# Patient Record
Sex: MALE | Race: WHITE | Employment: OTHER | URBAN - METROPOLITAN AREA
[De-identification: names, ages, dates, MRNs, and addresses within clinical notes are randomized per-mention and may not be internally consistent; named-entity substitution may affect disease eponyms.]

---

## 2023-04-06 LAB
AVERAGE GLUCOSE: 131
HBA1C MFR BLD: 6.2 %

## 2023-06-01 ENCOUNTER — HOSPITAL ENCOUNTER (OUTPATIENT)
Dept: CT IMAGING | Age: 73
Discharge: HOME | End: 2023-06-01
Payer: MEDICARE

## 2023-06-01 DIAGNOSIS — R93.89 ABNORMAL CHEST X-RAY: ICD-10-CM

## 2023-06-01 PROCEDURE — 71250 CT THORAX DX C-: CPT

## 2023-06-28 ENCOUNTER — INITIAL CONSULT (OUTPATIENT)
Age: 73
End: 2023-06-28
Payer: MEDICARE

## 2023-06-28 VITALS
HEART RATE: 53 BPM | BODY MASS INDEX: 28 KG/M2 | WEIGHT: 200 LBS | DIASTOLIC BLOOD PRESSURE: 68 MMHG | SYSTOLIC BLOOD PRESSURE: 130 MMHG | HEIGHT: 71 IN

## 2023-06-28 DIAGNOSIS — I48.20 CHRONIC A-FIB (HCC): ICD-10-CM

## 2023-06-28 DIAGNOSIS — Z76.89 ESTABLISHING CARE WITH NEW DOCTOR, ENCOUNTER FOR: Primary | ICD-10-CM

## 2023-06-28 LAB
ANION GAP SERPL CALC-SCNC: 6 MMOL/L (ref 2–11)
BUN SERPL-MCNC: 14 MG/DL (ref 8–23)
CALCIUM SERPL-MCNC: 9.6 MG/DL (ref 8.3–10.4)
CHLORIDE SERPL-SCNC: 104 MMOL/L (ref 101–110)
CO2 SERPL-SCNC: 28 MMOL/L (ref 21–32)
CREAT SERPL-MCNC: 0.9 MG/DL (ref 0.8–1.5)
GLUCOSE SERPL-MCNC: 94 MG/DL (ref 65–100)
POTASSIUM SERPL-SCNC: 4.5 MMOL/L (ref 3.5–5.1)
SODIUM SERPL-SCNC: 138 MMOL/L (ref 133–143)

## 2023-06-28 PROCEDURE — 1123F ACP DISCUSS/DSCN MKR DOCD: CPT | Performed by: INTERNAL MEDICINE

## 2023-06-28 PROCEDURE — 99204 OFFICE O/P NEW MOD 45 MIN: CPT | Performed by: INTERNAL MEDICINE

## 2023-06-28 PROCEDURE — G8427 DOCREV CUR MEDS BY ELIG CLIN: HCPCS | Performed by: INTERNAL MEDICINE

## 2023-06-28 PROCEDURE — G8419 CALC BMI OUT NRM PARAM NOF/U: HCPCS | Performed by: INTERNAL MEDICINE

## 2023-06-28 PROCEDURE — 93000 ELECTROCARDIOGRAM COMPLETE: CPT | Performed by: INTERNAL MEDICINE

## 2023-06-28 PROCEDURE — 1036F TOBACCO NON-USER: CPT | Performed by: INTERNAL MEDICINE

## 2023-06-28 PROCEDURE — 3017F COLORECTAL CA SCREEN DOC REV: CPT | Performed by: INTERNAL MEDICINE

## 2023-06-28 RX ORDER — ATORVASTATIN CALCIUM 20 MG/1
20 TABLET, FILM COATED ORAL NIGHTLY
COMMUNITY
Start: 2022-05-24

## 2023-06-28 RX ORDER — ESCITALOPRAM OXALATE 20 MG/1
20 TABLET ORAL DAILY
COMMUNITY
Start: 2015-01-18

## 2023-06-28 RX ORDER — MAGNESIUM 200 MG
TABLET ORAL
COMMUNITY

## 2023-06-28 RX ORDER — METFORMIN HYDROCHLORIDE 500 MG/1
2000 TABLET, EXTENDED RELEASE ORAL DAILY
COMMUNITY
Start: 2020-09-23

## 2023-06-29 ENCOUNTER — TELEPHONE (OUTPATIENT)
Age: 73
End: 2023-06-29

## 2023-06-29 NOTE — RESULT ENCOUNTER NOTE
Kidney function is completely normal on your lab test.  You are on the correct dose of Xarelto at 20 mg daily.     Heather Leblanc MD

## 2023-07-10 ENCOUNTER — TELEPHONE (OUTPATIENT)
Age: 73
End: 2023-07-10

## 2023-07-10 NOTE — TELEPHONE ENCOUNTER
Spoke to pt and informed him regarding Dr. Montes Loop result and conclusion. Pt verbalized understanding.

## 2023-07-10 NOTE — TELEPHONE ENCOUNTER
----- Message from Lilibeth Song MD sent at 7/10/2023  9:00 AM EDT -----  Echo shows some abnormalities, none of which are urgent we can discuss them at your follow up appointment.     Maria G Sheehan MD

## 2023-08-25 ENCOUNTER — OFFICE VISIT (OUTPATIENT)
Dept: PULMONOLOGY | Age: 73
End: 2023-08-25
Payer: MEDICARE

## 2023-08-25 VITALS
SYSTOLIC BLOOD PRESSURE: 120 MMHG | DIASTOLIC BLOOD PRESSURE: 80 MMHG | RESPIRATION RATE: 20 BRPM | OXYGEN SATURATION: 97 % | HEIGHT: 71 IN | BODY MASS INDEX: 28.28 KG/M2 | HEART RATE: 65 BPM | TEMPERATURE: 98 F | WEIGHT: 202 LBS

## 2023-08-25 DIAGNOSIS — J84.9 ILD (INTERSTITIAL LUNG DISEASE) (HCC): Primary | ICD-10-CM

## 2023-08-25 DIAGNOSIS — J84.9 ILD (INTERSTITIAL LUNG DISEASE) (HCC): ICD-10-CM

## 2023-08-25 LAB
CK SERPL-CCNC: 45 U/L (ref 21–215)
CRP SERPL-MCNC: <0.3 MG/DL (ref 0–0.9)
ERYTHROCYTE [SEDIMENTATION RATE] IN BLOOD: 19 MM/HR

## 2023-08-25 PROCEDURE — 3017F COLORECTAL CA SCREEN DOC REV: CPT | Performed by: INTERNAL MEDICINE

## 2023-08-25 PROCEDURE — G8427 DOCREV CUR MEDS BY ELIG CLIN: HCPCS | Performed by: INTERNAL MEDICINE

## 2023-08-25 PROCEDURE — G8419 CALC BMI OUT NRM PARAM NOF/U: HCPCS | Performed by: INTERNAL MEDICINE

## 2023-08-25 PROCEDURE — 99204 OFFICE O/P NEW MOD 45 MIN: CPT | Performed by: INTERNAL MEDICINE

## 2023-08-25 PROCEDURE — 1036F TOBACCO NON-USER: CPT | Performed by: INTERNAL MEDICINE

## 2023-08-25 PROCEDURE — 1123F ACP DISCUSS/DSCN MKR DOCD: CPT | Performed by: INTERNAL MEDICINE

## 2023-08-25 NOTE — PROGRESS NOTES
Control; Adjustment of the mA and/or kV according to patient  size (this includes techniques or standardized protocols for targeted exams  where dose is matched to indication/reason for exam); and Use of Iterative  Reconstruction Technique. Total radiation dose: 690 mGy-cm    Comparison:  None. Findings:  Images through the lungs demonstrate no evidence of pulmonary nodule or mass. No  effusions are identified. High-resolution imaging performed supine and prone demonstrates peripheral  reticulation as well as slight honeycombing in the peripheral lingula and septal  thickening at the lung bases with mild basilar bronchiectasis. There is coronary artery atherosclerotic disease. The heart is upper limits of  normal in size to mildly enlarged. No evidence of aortic aneurysmal dilatation  is seen. The heart and mediastinum are grossly unremarkable. No significant hilar or  mediastinal lymphadenopathy is demonstrated. Imaged chest wall structures as well as visualized portions of the upper abdomen  are unremarkable in appearance. Cholecystectomy. There are no acute osseous abnormalities. Impression  1. 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 6/1/23        Nuclear Medicine: No results found for this or any previous visit from the past 3650 days. PFTs:   No flowsheet data found. No results found for this or any previous visit. No results found for this or any previous visit. FeNO: No results found for this or any previous visit. FeNO and Likelihood of Eosinophilic Asthma   Unlikely Intermediate Likely   <25 ppb 25-50 ppb >50ppb     Exercise Oximetry:    8/25/23  Resting RA Sat - 97  Lowest ambulating RA Sat - 92    Echo:   TRANSTHORACIC ECHOCARDIOGRAM (TTE) COMPLETE (CONTRAST/BUBBLE/3D PRN) 07/03/2023    Interpretation Summary    Left Ventricle: Normal left ventricular systolic function.  EF by 2D [employer] Biplane is

## 2023-08-26 LAB — ANA SER QL: NEGATIVE

## 2023-08-28 LAB — RHEUMATOID FACT SER QL LA: NEGATIVE

## 2023-08-31 LAB
A FUMIGATUS1 AB SER QL ID: NEGATIVE
A PULLULANS AB SER QL: NEGATIVE
LACEYELLA SACCHARI AB SER QL: NEGATIVE
PIGEON SERUM AB QL ID: NEGATIVE
S RECTIVIRGULA IGG SER QL ID: NEGATIVE
T VULGARIS AB SER QL ID: NEGATIVE

## 2023-09-01 DIAGNOSIS — J84.9 ILD (INTERSTITIAL LUNG DISEASE) (HCC): Primary | ICD-10-CM

## 2023-09-29 ENCOUNTER — OFFICE VISIT (OUTPATIENT)
Age: 73
End: 2023-09-29
Payer: MEDICARE

## 2023-09-29 VITALS
BODY MASS INDEX: 24.5 KG/M2 | HEART RATE: 68 BPM | WEIGHT: 175 LBS | HEIGHT: 71 IN | SYSTOLIC BLOOD PRESSURE: 120 MMHG | DIASTOLIC BLOOD PRESSURE: 78 MMHG

## 2023-09-29 DIAGNOSIS — I48.11 LONGSTANDING PERSISTENT ATRIAL FIBRILLATION (HCC): ICD-10-CM

## 2023-09-29 DIAGNOSIS — I48.11 LONGSTANDING PERSISTENT ATRIAL FIBRILLATION (HCC): Primary | ICD-10-CM

## 2023-09-29 DIAGNOSIS — I50.23 ACUTE ON CHRONIC SYSTOLIC HEART FAILURE (HCC): ICD-10-CM

## 2023-09-29 DIAGNOSIS — D68.69 HYPERCOAGULABLE STATE DUE TO LONGSTANDING PERSISTENT ATRIAL FIBRILLATION (HCC): ICD-10-CM

## 2023-09-29 DIAGNOSIS — I48.11 HYPERCOAGULABLE STATE DUE TO LONGSTANDING PERSISTENT ATRIAL FIBRILLATION (HCC): ICD-10-CM

## 2023-09-29 LAB
ANION GAP SERPL CALC-SCNC: 9 MMOL/L (ref 2–11)
BUN SERPL-MCNC: 33 MG/DL (ref 8–23)
CALCIUM SERPL-MCNC: 9.2 MG/DL (ref 8.3–10.4)
CHLORIDE SERPL-SCNC: 100 MMOL/L (ref 101–110)
CO2 SERPL-SCNC: 30 MMOL/L (ref 21–32)
CREAT SERPL-MCNC: 1.3 MG/DL (ref 0.8–1.5)
GLUCOSE SERPL-MCNC: 136 MG/DL (ref 65–100)
MAGNESIUM SERPL-MCNC: 2.1 MG/DL (ref 1.8–2.4)
POTASSIUM SERPL-SCNC: 4.2 MMOL/L (ref 3.5–5.1)
SODIUM SERPL-SCNC: 139 MMOL/L (ref 133–143)

## 2023-09-29 PROCEDURE — 3017F COLORECTAL CA SCREEN DOC REV: CPT | Performed by: INTERNAL MEDICINE

## 2023-09-29 PROCEDURE — 1036F TOBACCO NON-USER: CPT | Performed by: INTERNAL MEDICINE

## 2023-09-29 PROCEDURE — 99214 OFFICE O/P EST MOD 30 MIN: CPT | Performed by: INTERNAL MEDICINE

## 2023-09-29 PROCEDURE — G8420 CALC BMI NORM PARAMETERS: HCPCS | Performed by: INTERNAL MEDICINE

## 2023-09-29 PROCEDURE — 1123F ACP DISCUSS/DSCN MKR DOCD: CPT | Performed by: INTERNAL MEDICINE

## 2023-09-29 PROCEDURE — G8427 DOCREV CUR MEDS BY ELIG CLIN: HCPCS | Performed by: INTERNAL MEDICINE

## 2023-09-29 RX ORDER — FUROSEMIDE 40 MG/1
40 TABLET ORAL DAILY
COMMUNITY

## 2023-09-29 RX ORDER — RAMIPRIL 2.5 MG/1
2.5 CAPSULE ORAL DAILY
COMMUNITY

## 2023-09-29 ASSESSMENT — ENCOUNTER SYMPTOMS
ABDOMINAL PAIN: 0
SHORTNESS OF BREATH: 0
EYES NEGATIVE: 1
ALLERGIC/IMMUNOLOGIC NEGATIVE: 1
RESPIRATORY NEGATIVE: 1
PHOTOPHOBIA: 0
EYE PAIN: 0
CHEST TIGHTNESS: 0
BACK PAIN: 0
GASTROINTESTINAL NEGATIVE: 1

## 2023-10-02 RX ORDER — FUROSEMIDE 20 MG/1
20 TABLET ORAL DAILY
Qty: 30 TABLET | Refills: 3 | Status: CANCELLED | OUTPATIENT
Start: 2023-10-02

## 2023-10-02 NOTE — TELEPHONE ENCOUNTER
----- Message from Vincenzo Lindsay MD sent at 10/2/2023  8:56 AM EDT -----  BMP that we recently checked shows some signs of mild dehydration. Lets cut back to Lasix 20 mg daily from Lasix 40 mg daily.     Jeff Green MD

## 2023-10-02 NOTE — TELEPHONE ENCOUNTER
I spoke to pt's wife, and explained it to her. She stated that they didn't get the lasix. I checked on my end and I don't see if it got called in or not.    Requested Prescriptions     Pending Prescriptions Disp Refills    furosemide (LASIX) 20 MG tablet 30 tablet 3     Sig: Take 1 tablet by mouth daily

## 2023-10-02 NOTE — RESULT ENCOUNTER NOTE
BMP that we recently checked shows some signs of mild dehydration. Lets cut back to Lasix 20 mg daily from Lasix 40 mg daily.     Staci Summers MD

## 2023-10-02 NOTE — TELEPHONE ENCOUNTER
Spoke with pt's wife again, and she stated that she found the medication and will have him take half of Lasix 40mg.

## 2023-10-26 ENCOUNTER — OFFICE VISIT (OUTPATIENT)
Dept: ENT CLINIC | Age: 73
End: 2023-10-26
Payer: MEDICARE

## 2023-10-26 VITALS — BODY MASS INDEX: 23.94 KG/M2 | HEIGHT: 71 IN | WEIGHT: 171 LBS | RESPIRATION RATE: 16 BRPM

## 2023-10-26 DIAGNOSIS — J45.41 MODERATE PERSISTENT REACTIVE AIRWAY DISEASE WITH ACUTE EXACERBATION: ICD-10-CM

## 2023-10-26 DIAGNOSIS — R49.0 DYSPHONIA: ICD-10-CM

## 2023-10-26 DIAGNOSIS — J38.01 PARALYSIS OF LEFT VOCAL CORD: Primary | Chronic | ICD-10-CM

## 2023-10-26 PROCEDURE — 1123F ACP DISCUSS/DSCN MKR DOCD: CPT | Performed by: PHYSICIAN ASSISTANT

## 2023-10-26 PROCEDURE — 99204 OFFICE O/P NEW MOD 45 MIN: CPT | Performed by: PHYSICIAN ASSISTANT

## 2023-10-26 PROCEDURE — 31575 DIAGNOSTIC LARYNGOSCOPY: CPT | Performed by: PHYSICIAN ASSISTANT

## 2023-10-26 RX ORDER — FLUTICASONE PROPIONATE 110 UG/1
2 AEROSOL, METERED RESPIRATORY (INHALATION) 2 TIMES DAILY
Qty: 12 G | Refills: 3 | Status: SHIPPED | OUTPATIENT
Start: 2023-10-26 | End: 2023-10-27 | Stop reason: SDUPTHER

## 2023-10-26 RX ORDER — TRAZODONE HYDROCHLORIDE 50 MG/1
TABLET ORAL
COMMUNITY
Start: 2023-10-02

## 2023-10-26 RX ORDER — ASCORBIC ACID 125 MG
TABLET,CHEWABLE ORAL
COMMUNITY
Start: 2023-06-03

## 2023-10-26 RX ORDER — PREDNISONE 20 MG/1
20 TABLET ORAL 2 TIMES DAILY
Qty: 10 TABLET | Refills: 1 | Status: SHIPPED | OUTPATIENT
Start: 2023-10-26 | End: 2023-10-27 | Stop reason: SDUPTHER

## 2023-10-26 ASSESSMENT — ENCOUNTER SYMPTOMS
EYE PAIN: 0
COLOR CHANGE: 0
WHEEZING: 0
VOMITING: 0
DIARRHEA: 0
COUGH: 0

## 2023-10-27 RX ORDER — FLUTICASONE PROPIONATE 110 UG/1
2 AEROSOL, METERED RESPIRATORY (INHALATION) 2 TIMES DAILY
Qty: 12 G | Refills: 3 | Status: SHIPPED | OUTPATIENT
Start: 2023-10-27 | End: 2024-10-26

## 2023-10-27 RX ORDER — PREDNISONE 20 MG/1
20 TABLET ORAL 2 TIMES DAILY
Qty: 10 TABLET | Refills: 0 | Status: SHIPPED | OUTPATIENT
Start: 2023-10-27 | End: 2023-11-01

## 2023-10-27 NOTE — TELEPHONE ENCOUNTER
Selin Chavis sent Rx in yesterday but pharmacy needs to be changed. Can you please sign off. Thank you.

## 2023-11-02 RX ORDER — FLUTICASONE FUROATE 100 UG/1
30 POWDER RESPIRATORY (INHALATION) 2 TIMES DAILY
Qty: 30 EACH | Refills: 3 | Status: SHIPPED | OUTPATIENT
Start: 2023-11-02 | End: 2023-12-02

## 2023-11-06 RX ORDER — FUROSEMIDE 20 MG/1
20 TABLET ORAL DAILY
Qty: 90 TABLET | Refills: 3 | Status: SHIPPED | OUTPATIENT
Start: 2023-11-06

## 2023-11-06 NOTE — TELEPHONE ENCOUNTER
----- Message from Nabeel Chinchilla MA sent at 2023  6:39 PM EST -----  Regarding: FW: Lasix  Contact: [phone]    ----- Message -----  From: Manny Triplett  Sent: 2023   3:47 PM EST  To: [facility] Clinical Staff  Subject: Lasix                                            Dr Daryl Garcia, Your team spoke with my wife [employer] in early October and reduced the Teva-furosemide (lasix) that I was given at the [facility] in [place] from 40 mg to 20 mg. I will be out of the medication this week. Do you want me to continue it? If so, will this be a long term thing? If I only need it for short term, please send a new prescription to the [facility] on  [address] [place] [address]. If I need to continue it long term will you send a new prescription to the [address] [place] [address].     Thank you,  Manny Triplett   93-

## 2023-11-13 ENCOUNTER — HOSPITAL ENCOUNTER (OUTPATIENT)
Dept: PHYSICAL THERAPY | Age: 73
Setting detail: RECURRING SERIES
Discharge: HOME | End: 2023-11-16
Payer: MEDICARE

## 2023-11-13 DIAGNOSIS — R49.0 DYSPHONIA: Primary | ICD-10-CM

## 2023-11-13 PROCEDURE — 92507 TX SP LANG VOICE COMM INDIV: CPT

## 2023-11-13 PROCEDURE — 92523 SPEECH SOUND LANG COMPREHEN: CPT

## 2023-11-13 NOTE — PROGRESS NOTES
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 Medications Last Reviewed:  2023  Subjective Comments:  Seen for initial evaluation and treatment today. Pain:  Patient does not c/o pain. Interventions Planned: (Treatment may consist of any combination of the following)    GOALS: (Goals have been discussed and agreed upon with patient.)  Short-Term Functional Goals: Time Frame: 8 weeks  Patient will demonstrate implementation of vocal hygiene techniques including improved hydration, reduced habitual coughing, throat clearing, and adherence to reflux precautions and medications regiment, reduction of vocally abusive behaviors with 80% adherence on a daily basis to reduce laryngeal inflammation. Patient will perform vocal function exercises with 80% efficiency with mod cues. Patient will implement vocal hygiene practices and relaxation techniques to reduce vocal strain with 80% accuracy with mod cues. Patient will demonstrate resonant voice therapy techniques across tasks with 80% accuracy given mod cues. Patient will sustain vowel for 10 seconds with SBA. Patient will apply easy onset of phonation across tasks with >80% accuracy given mod cues. Patient will demonstrate breath control for a minimum of 10 seconds when vocalizing.   Discharge Goals: Time

## 2023-11-20 ENCOUNTER — HOSPITAL ENCOUNTER (OUTPATIENT)
Dept: PHYSICAL THERAPY | Age: 73
Setting detail: RECURRING SERIES
Discharge: HOME | End: 2023-11-23
Payer: MEDICARE

## 2023-11-20 PROCEDURE — 92507 TX SP LANG VOICE COMM INDIV: CPT

## 2023-11-20 NOTE — PROGRESS NOTES
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 Medications Last Reviewed:  2023  Subjective Comments:  Patient accompanied by spouse. Has used the saline rinse which has been helping a lot with expelling congestion. Pain:  Patient does not c/o pain. Interventions Planned: (Treatment may consist of any combination of the following)  Voice based treatment, Vocal hygiene practices, Training in compensatory strategies, and Education  GOALS: (Goals have been discussed and agreed upon with patient.)  Short-Term Functional Goals: Time Frame: 8 weeks  Patient will demonstrate implementation of vocal hygiene techniques including improved hydration, reduced habitual coughing, throat clearing, and adherence to reflux precautions and medications regiment, reduction of vocally abusive behaviors with 80% adherence on a daily basis to reduce laryngeal inflammation. Patient will perform vocal function exercises with 80% efficiency with mod cues. Patient will implement vocal hygiene practices and relaxation techniques to reduce vocal strain with 80% accuracy with mod cues. Patient will demonstrate resonant voice therapy techniques across tasks with 80% accuracy given mod cues. Patient will sustain vowel for 10 seconds with SBA.   Patient will apply easy onset of

## 2023-11-29 ENCOUNTER — HOSPITAL ENCOUNTER (OUTPATIENT)
Dept: PHYSICAL THERAPY | Age: 73
Setting detail: RECURRING SERIES
End: 2023-11-29
Payer: MEDICARE

## 2023-11-29 NOTE — PROGRESS NOTES
Conchita Walsh  : 1950  Primary: [employer]  Secondary:  Peggy Freeman  [address]  Phone: [phone]  Fax: [phone]    SLP Visit Info:  Canceled Appointment: 1 ( sick)  Total # of Visits to Date: 2      OUTPATIENT THERAPY: 2023  Episode  Appt Desk        Conchita Walsh cancelled his appointment for today due to illness. Will plan to follow up next during next appointment.   Thank you,  LUIS MIGUEL Robbins    Future Appointments   Date Time Provider [address]   2023  7:00 PM Sea Reeder [facility]   2023 11:30 AM Vincenzo Lindsay MD [facility] AMB   2023  2:30 PM LUIS MIGUEL Robbins [facility]   2023  3:00 PM Michele Roberson MD PPS [facility] AMB   2023 11:00 AM LUIS MIGUEL Robbins [facility] SFD   2023  2:15 PM BRUCE Wang ENTG [facility] AMB   2023  1:00 PM LUIS MIGUEL Robbins [facility]

## 2023-11-30 ENCOUNTER — TELEPHONE (OUTPATIENT)
Dept: PULMONOLOGY | Age: 73
End: 2023-11-30

## 2023-11-30 NOTE — TELEPHONE ENCOUNTER
----- Message from Romulo Hannah, [address] [facility] sent at 11/28/2023  5:00 PM EST -----  Regarding: CPFT  Patient has an appointment with Dr. Maria Victoria Reinoso, on 12/05/23. They were supposed to get a CPFT done prior to their appt and this was not completed. Can we call the patient and see if we can get this done before their appointment or we may need to reschedule.   Thank you so much! // Deni Lewis

## 2023-11-30 NOTE — TELEPHONE ENCOUNTER
Spoke to patients wife. Patient does not currently have a voice. He contracted covid back in September and had pneumonia and has not been doing well since so they had to cancel his CPFTs due to this. Will be here to see Dr. Candida Dewitt on 12/5.  GEO

## 2023-12-04 ENCOUNTER — OFFICE VISIT (OUTPATIENT)
Age: 73
End: 2023-12-04
Payer: MEDICARE

## 2023-12-04 ENCOUNTER — HOSPITAL ENCOUNTER (OUTPATIENT)
Dept: PHYSICAL THERAPY | Age: 73
Setting detail: RECURRING SERIES
Discharge: HOME | End: 2023-12-07
Payer: MEDICARE

## 2023-12-04 VITALS
SYSTOLIC BLOOD PRESSURE: 132 MMHG | DIASTOLIC BLOOD PRESSURE: 64 MMHG | HEART RATE: 74 BPM | WEIGHT: 190.2 LBS | BODY MASS INDEX: 26.63 KG/M2 | HEIGHT: 71 IN

## 2023-12-04 DIAGNOSIS — I48.11 HYPERCOAGULABLE STATE DUE TO LONGSTANDING PERSISTENT ATRIAL FIBRILLATION (HCC): ICD-10-CM

## 2023-12-04 DIAGNOSIS — I50.23 ACUTE ON CHRONIC SYSTOLIC HEART FAILURE (HCC): ICD-10-CM

## 2023-12-04 DIAGNOSIS — I48.11 LONGSTANDING PERSISTENT ATRIAL FIBRILLATION (HCC): ICD-10-CM

## 2023-12-04 DIAGNOSIS — D68.69 HYPERCOAGULABLE STATE DUE TO LONGSTANDING PERSISTENT ATRIAL FIBRILLATION (HCC): ICD-10-CM

## 2023-12-04 DIAGNOSIS — I50.23 ACUTE ON CHRONIC SYSTOLIC HEART FAILURE (HCC): Primary | ICD-10-CM

## 2023-12-04 LAB
ANION GAP SERPL CALC-SCNC: 7 MMOL/L (ref 2–11)
BUN SERPL-MCNC: 14 MG/DL (ref 8–23)
CALCIUM SERPL-MCNC: 9.5 MG/DL (ref 8.3–10.4)
CHLORIDE SERPL-SCNC: 103 MMOL/L (ref 101–110)
CO2 SERPL-SCNC: 27 MMOL/L (ref 21–32)
CREAT SERPL-MCNC: 1.1 MG/DL (ref 0.8–1.5)
GLUCOSE SERPL-MCNC: 84 MG/DL (ref 65–100)
NT PRO BNP: 711 PG/ML (ref 5–125)
POTASSIUM SERPL-SCNC: 4.6 MMOL/L (ref 3.5–5.1)
SODIUM SERPL-SCNC: 137 MMOL/L (ref 133–143)

## 2023-12-04 PROCEDURE — 92507 TX SP LANG VOICE COMM INDIV: CPT

## 2023-12-04 PROCEDURE — G8419 CALC BMI OUT NRM PARAM NOF/U: HCPCS | Performed by: INTERNAL MEDICINE

## 2023-12-04 PROCEDURE — 3017F COLORECTAL CA SCREEN DOC REV: CPT | Performed by: INTERNAL MEDICINE

## 2023-12-04 PROCEDURE — G8427 DOCREV CUR MEDS BY ELIG CLIN: HCPCS | Performed by: INTERNAL MEDICINE

## 2023-12-04 PROCEDURE — G8484 FLU IMMUNIZE NO ADMIN: HCPCS | Performed by: INTERNAL MEDICINE

## 2023-12-04 PROCEDURE — 99214 OFFICE O/P EST MOD 30 MIN: CPT | Performed by: INTERNAL MEDICINE

## 2023-12-04 PROCEDURE — 1036F TOBACCO NON-USER: CPT | Performed by: INTERNAL MEDICINE

## 2023-12-04 PROCEDURE — 1123F ACP DISCUSS/DSCN MKR DOCD: CPT | Performed by: INTERNAL MEDICINE

## 2023-12-04 RX ORDER — LOSARTAN POTASSIUM 25 MG/1
25 TABLET ORAL DAILY
Qty: 30 TABLET | Refills: 11 | Status: SHIPPED | OUTPATIENT
Start: 2023-12-04

## 2023-12-04 RX ORDER — METOPROLOL SUCCINATE 25 MG/1
25 TABLET, EXTENDED RELEASE ORAL DAILY
Qty: 30 TABLET | Refills: 11 | Status: SHIPPED | OUTPATIENT
Start: 2023-12-04

## 2023-12-04 ASSESSMENT — ENCOUNTER SYMPTOMS
EYES NEGATIVE: 1
GASTROINTESTINAL NEGATIVE: 1
RESPIRATORY NEGATIVE: 1
CHEST TIGHTNESS: 0
BACK PAIN: 0
ALLERGIC/IMMUNOLOGIC NEGATIVE: 1
ABDOMINAL PAIN: 0
EYE PAIN: 0
SHORTNESS OF BREATH: 0
PHOTOPHOBIA: 0

## 2023-12-04 NOTE — PROGRESS NOTES
given mod cues. Patient will sustain vowel for 10 seconds with SBA. Patient will apply easy onset of phonation across tasks with >80% accuracy given mod cues. Patient will demonstrate breath control for a minimum of 10 seconds when vocalizing. Discharge Goals: Time Frame: 12 weeks  Patient will demonstrate improved vocal quality/loudness/intonation to communicate effectively across environments. Updated Objective Findings:  None Today  Treatment   Vocal hygiene: Patient reports using saline rinse, states it is helping expel a lot of congestion, decreased caffeine intake, <64 oz water/day, replaces throat clear with \"silent cough\"    Voice Activities:  Vocal quality breathy, whispered  Diaphragmatic breathing: completed x6, adequate inspiratory/expiratory effort, thoracic breathing; shoulder movement in upright position, not present when in partially reclined positioning  Yawn sigh x4, achieved slight voicing with strained vocal quality, low pitch, increased laryngeal effort  Tongue trill: sustained pitch with voicing x10, held up to 3 seconds, unable to vary pitch; remains pharyngeal focused, low volume  Straw phonation: 5x with sustained voicing, breathy with slight voicing, increased laryngeal effort noted  Designed to strengthen the vocal fold and improve breath support for speaking. Patient completed 1 set of 5 in the session with min cues, including sustained /u/ sound - breathy vocal quality; attempted \"whoop\" going up in pitch, \"boom\" going down in pitch, though deferred further trials d/t limited voicing present  Hum with \"m\" in isolation x5 - slight voicing, guttural sound, pharyngeal focused, unable to bring forward voicing  Voice in conversation: pharyngeal focused, Audible breath in between phrasing utterances    HEP: Diaphragmatic breathing, yawn sigh, /u/ sustained, hum isolation    Treatment/Session Summary: Head turn positioning does not seem to improve voicing for pt during exercises.

## 2023-12-04 NOTE — PROGRESS NOTES
[facility] CARDIOLOGY  [address]  [place], [address] [facility]  PHONE: [phone]      23    NAME:  Rama Forman  : 1950  MRN: [id number]         SUBJECTIVE:   Rama Forman is a 75 y.o. male seen for follow up of:      Chief Complaint   Patient presents with    Results     echo    Atrial Fibrillation        Cardiac Hx (Reviewed and summarized by me): Followed by Dr Vannesa Mcgovern MD at [address] [facility] [address]  1) Chronic Afib - since   2) HLD              Lipids 23 - HDL 42, LDL 55, Trig 111  3) HTN  4) CKD              23 - Stage IIIb, eGFR 32, Cr 2.13 labs (Creatinine clearance, original Cockcroft-Gault: 32)              23 - Estimated Creatinine Clearance: 77 mL/min (based on SCr of 0.9 mg/dL). 5) Echo              23 - LVEF 65% mod MR, mod TR, LAE, AHEMT, aortic root 4.0 cm. RVSP 45 mmHg              23 - LVEF 30-35% with mild TR and MR RVSP 35.2   23 - LVEF 42% with m/mMR, modTR, BiAE        Retired [employer] . HPI:  Echocardiogram shows a modest improvement in his ejection fraction. It still remains depressed. For some reason he is no longer on the ramipril. We decreased his Lasix dose from 40 mg daily to 20 mg daily. He has crackles on exam but no lower extremity edema and is not complaining of PND. I am concerned there may be some fibrotic lung disease but I am hearing and not fluid in his lungs. Past Medical History, Past Surgical History, Family history, Social History, and Medications were all reviewed with the patient today and updated as necessary.        Current Outpatient Medications:     losartan (COZAAR) 25 MG tablet, Take 1 tablet by mouth daily, Disp: 30 tablet, Rfl: 11    metoprolol succinate (TOPROL XL) 25 MG extended release tablet, Take 1 tablet by mouth daily, Disp: 30 tablet, Rfl: 11    furosemide (LASIX) 20 MG tablet, Take 1 tablet by mouth daily, Disp: 90 tablet, Rfl: 3    traZODone (DESYREL) 50 MG

## 2023-12-05 ENCOUNTER — TELEPHONE (OUTPATIENT)
Age: 73
End: 2023-12-05

## 2023-12-05 ENCOUNTER — OFFICE VISIT (OUTPATIENT)
Dept: PULMONOLOGY | Age: 73
End: 2023-12-05
Payer: MEDICARE

## 2023-12-05 VITALS
SYSTOLIC BLOOD PRESSURE: 105 MMHG | HEIGHT: 71 IN | WEIGHT: 193 LBS | TEMPERATURE: 98 F | DIASTOLIC BLOOD PRESSURE: 80 MMHG | HEART RATE: 65 BPM | OXYGEN SATURATION: 93 % | BODY MASS INDEX: 27.02 KG/M2 | RESPIRATION RATE: 20 BRPM

## 2023-12-05 DIAGNOSIS — G47.33 OSA (OBSTRUCTIVE SLEEP APNEA): ICD-10-CM

## 2023-12-05 DIAGNOSIS — J12.82 PNEUMONIA DUE TO COVID-19 VIRUS: ICD-10-CM

## 2023-12-05 DIAGNOSIS — J84.9 ILD (INTERSTITIAL LUNG DISEASE) (HCC): Primary | ICD-10-CM

## 2023-12-05 DIAGNOSIS — U07.1 PNEUMONIA DUE TO COVID-19 VIRUS: ICD-10-CM

## 2023-12-05 DIAGNOSIS — J38.00 VOCAL CORD PARALYSIS: ICD-10-CM

## 2023-12-05 PROCEDURE — 1036F TOBACCO NON-USER: CPT | Performed by: INTERNAL MEDICINE

## 2023-12-05 PROCEDURE — 3017F COLORECTAL CA SCREEN DOC REV: CPT | Performed by: INTERNAL MEDICINE

## 2023-12-05 PROCEDURE — G8484 FLU IMMUNIZE NO ADMIN: HCPCS | Performed by: INTERNAL MEDICINE

## 2023-12-05 PROCEDURE — G8427 DOCREV CUR MEDS BY ELIG CLIN: HCPCS | Performed by: INTERNAL MEDICINE

## 2023-12-05 PROCEDURE — 1123F ACP DISCUSS/DSCN MKR DOCD: CPT | Performed by: INTERNAL MEDICINE

## 2023-12-05 PROCEDURE — G8419 CALC BMI OUT NRM PARAM NOF/U: HCPCS | Performed by: INTERNAL MEDICINE

## 2023-12-05 PROCEDURE — 99214 OFFICE O/P EST MOD 30 MIN: CPT | Performed by: INTERNAL MEDICINE

## 2023-12-05 NOTE — TELEPHONE ENCOUNTER
----- Message from Charo Mcnair MD sent at 12/5/2023  8:15 AM EST -----  Please call mr Riley Flatter and have him increase his lasix back to 40 mg daily and arrange for a bMP this time next week. Labs indicate he may have some element of fluid in his lung causing SOB.     Cheli Pérez MD

## 2023-12-05 NOTE — PATIENT INSTRUCTIONS
If you do not hear from Radiology Scheduling within 1 week please call the number below:    [facility] Rad [facility]  P: [phone]

## 2023-12-05 NOTE — RESULT ENCOUNTER NOTE
Please call mr Varma and have him increase his lasix back to 40 mg daily and arrange for a bMP this time next week.  Labs indicate he may have some element of fluid in his lung causing SOB.    Andrew Rodriguez MD

## 2023-12-06 ENCOUNTER — HOSPITAL ENCOUNTER (OUTPATIENT)
Dept: PHYSICAL THERAPY | Age: 73
Setting detail: RECURRING SERIES
Discharge: HOME | End: 2023-12-09
Payer: MEDICARE

## 2023-12-06 DIAGNOSIS — I50.23 ACUTE ON CHRONIC SYSTOLIC HEART FAILURE (HCC): ICD-10-CM

## 2023-12-06 LAB
ANION GAP SERPL CALC-SCNC: 4 MMOL/L (ref 2–11)
BUN SERPL-MCNC: 22 MG/DL (ref 8–23)
CALCIUM SERPL-MCNC: 10 MG/DL (ref 8.3–10.4)
CHLORIDE SERPL-SCNC: 102 MMOL/L (ref 103–113)
CO2 SERPL-SCNC: 30 MMOL/L (ref 21–32)
CREAT SERPL-MCNC: 1.2 MG/DL (ref 0.8–1.5)
GLUCOSE SERPL-MCNC: 93 MG/DL (ref 65–100)
POTASSIUM SERPL-SCNC: 4.8 MMOL/L (ref 3.5–5.1)
SODIUM SERPL-SCNC: 136 MMOL/L (ref 136–146)

## 2023-12-06 PROCEDURE — 92507 TX SP LANG VOICE COMM INDIV: CPT

## 2023-12-06 NOTE — RESULT ENCOUNTER NOTE
I wanted to check a BMP after 1 week of 40 mg daily of IV Lasix not 2 days later.  Please arrange for a BMP in 1 week.  Will discuss the findings of the lab tests at his follow-up visit.  There is concerned that he may be retaining water due to a weakened heart muscle will need to see how he responds to diuretic therapy.    Andrew Rodriguez MD

## 2023-12-06 NOTE — PROGRESS NOTES
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 Medications Last Reviewed:  2023  Subjective Comments:  Patient reports seeing pulmonology. Pain:  Patient does not c/o pain. Interventions Planned: (Treatment may consist of any combination of the following)  Voice based treatment, Vocal hygiene practices, Training in compensatory strategies, and Education  GOALS: (Goals have been discussed and agreed upon with patient.)  Short-Term Functional Goals: Time Frame: 8 weeks  Patient will demonstrate implementation of vocal hygiene techniques including improved hydration, reduced habitual coughing, throat clearing, and adherence to reflux precautions and medications regiment, reduction of vocally abusive behaviors with 80% adherence on a daily basis to reduce laryngeal inflammation. Patient will perform vocal function exercises with 80% efficiency with mod cues. Patient will implement vocal hygiene practices and relaxation techniques to reduce vocal strain with 80% accuracy with mod cues. Patient will demonstrate resonant voice therapy techniques across tasks with 80% accuracy given mod cues. Patient will sustain vowel for 10 seconds with SBA.   Patient will apply easy onset of phonation across tasks with >80% accuracy given

## 2023-12-07 ENCOUNTER — OFFICE VISIT (OUTPATIENT)
Dept: ENT CLINIC | Age: 73
End: 2023-12-07
Payer: MEDICARE

## 2023-12-07 ENCOUNTER — TELEPHONE (OUTPATIENT)
Age: 73
End: 2023-12-07

## 2023-12-07 VITALS
SYSTOLIC BLOOD PRESSURE: 105 MMHG | DIASTOLIC BLOOD PRESSURE: 80 MMHG | HEIGHT: 71 IN | BODY MASS INDEX: 27.02 KG/M2 | WEIGHT: 193 LBS

## 2023-12-07 DIAGNOSIS — J45.41 MODERATE PERSISTENT REACTIVE AIRWAY DISEASE WITH ACUTE EXACERBATION: ICD-10-CM

## 2023-12-07 DIAGNOSIS — R49.0 DYSPHONIA: ICD-10-CM

## 2023-12-07 DIAGNOSIS — J45.41 MODERATE PERSISTENT REACTIVE AIRWAY DISEASE WITH ACUTE EXACERBATION: Chronic | ICD-10-CM

## 2023-12-07 DIAGNOSIS — J38.01 PARALYSIS OF LEFT VOCAL CORD: Primary | Chronic | ICD-10-CM

## 2023-12-07 DIAGNOSIS — R49.0 DYSPHONIA: Chronic | ICD-10-CM

## 2023-12-07 DIAGNOSIS — J38.01 PARALYSIS OF LEFT VOCAL CORD: Primary | ICD-10-CM

## 2023-12-07 DIAGNOSIS — I50.23 ACUTE ON CHRONIC SYSTOLIC HEART FAILURE (HCC): Primary | ICD-10-CM

## 2023-12-07 PROCEDURE — 3017F COLORECTAL CA SCREEN DOC REV: CPT | Performed by: PHYSICIAN ASSISTANT

## 2023-12-07 PROCEDURE — 99213 OFFICE O/P EST LOW 20 MIN: CPT | Performed by: PHYSICIAN ASSISTANT

## 2023-12-07 PROCEDURE — 31575 DIAGNOSTIC LARYNGOSCOPY: CPT | Performed by: PHYSICIAN ASSISTANT

## 2023-12-07 PROCEDURE — G8419 CALC BMI OUT NRM PARAM NOF/U: HCPCS | Performed by: PHYSICIAN ASSISTANT

## 2023-12-07 PROCEDURE — 1036F TOBACCO NON-USER: CPT | Performed by: PHYSICIAN ASSISTANT

## 2023-12-07 PROCEDURE — G8484 FLU IMMUNIZE NO ADMIN: HCPCS | Performed by: PHYSICIAN ASSISTANT

## 2023-12-07 PROCEDURE — 1123F ACP DISCUSS/DSCN MKR DOCD: CPT | Performed by: PHYSICIAN ASSISTANT

## 2023-12-07 PROCEDURE — G8427 DOCREV CUR MEDS BY ELIG CLIN: HCPCS | Performed by: PHYSICIAN ASSISTANT

## 2023-12-07 ASSESSMENT — ENCOUNTER SYMPTOMS
RESPIRATORY NEGATIVE: 1
VOICE CHANGE: 1
ALLERGIC/IMMUNOLOGIC NEGATIVE: 1
EYES NEGATIVE: 1
GASTROINTESTINAL NEGATIVE: 1

## 2023-12-07 NOTE — TELEPHONE ENCOUNTER
----- Message from Kaye Bills MD sent at 12/6/2023  4:33 PM EST -----  I wanted to check a BMP after 1 week of 40 mg daily of IV Lasix not 2 days later. Please arrange for a BMP in 1 week. Will discuss the findings of the lab tests at his follow-up visit. There is concerned that he may be retaining water due to a weakened heart muscle will need to see how he responds to diuretic therapy.     Shanel Oconnor MD

## 2023-12-07 NOTE — TELEPHONE ENCOUNTER
I called pt's wife and explained to her instructions again. She stated that \"she forgot\" when it was supposed to be done. She said they will be out of town and return on next Wednesday. BMP ordered to be done 12/12/23.

## 2023-12-07 NOTE — PROGRESS NOTES
laryngoscope was passed through the most patent nasal cavity into the nasopharynx which was examined. The scope was then passed into the oropharynx. The hypopharynx, base of tongue, vallecula, epiglottis, aryepiglottic folds, arytenoids, false vocal cords, true vocal cords, subglottic area, pyriform sinuses, and the post cricoid area was examined. Assessment/Plan:  1. Paralysis of left vocal cord  -     CT SOFT TISSUE NECK W WO CONTRAST; Future  -     LARYNGOSCOPY,FLEX FIBER,DIAGNOSTIC  2. Dysphonia  3. Moderate persistent reactive airway disease with acute exacerbation    Pt has L TVC paralysis unchanged, edema around the hypopharynx has improved but his paralysis has not. Will proceed with imaging of neck and chest. And refer to Dr. Fabi Bolanos for augmentation. Return in about 2 weeks (around 12/21/2023) for review imaging , do not schedule i will call with results . Patient agrees with this plan. On this date 12/7/2023 I have spent 25 minutes reviewing previous notes, test results and face to face with the patient discussing the diagnosis and importance of compliance with the treatment plan as well as documenting on the day of the visit. BRUCE Leonard    This note was generated using voice recognition software, please excuse any typos.

## 2023-12-12 ENCOUNTER — TELEPHONE (OUTPATIENT)
Dept: PULMONOLOGY | Age: 73
End: 2023-12-12

## 2023-12-12 DIAGNOSIS — J84.9 ILD (INTERSTITIAL LUNG DISEASE) (HCC): Primary | ICD-10-CM

## 2023-12-13 ENCOUNTER — HOSPITAL ENCOUNTER (OUTPATIENT)
Dept: PHYSICAL THERAPY | Age: 73
Setting detail: RECURRING SERIES
Discharge: HOME | End: 2023-12-16
Payer: MEDICARE

## 2023-12-13 DIAGNOSIS — I50.23 ACUTE ON CHRONIC SYSTOLIC HEART FAILURE (HCC): ICD-10-CM

## 2023-12-13 LAB
ANION GAP SERPL CALC-SCNC: 4 MMOL/L (ref 2–11)
BUN SERPL-MCNC: 19 MG/DL (ref 8–23)
CALCIUM SERPL-MCNC: 9.3 MG/DL (ref 8.3–10.4)
CHLORIDE SERPL-SCNC: 101 MMOL/L (ref 103–113)
CO2 SERPL-SCNC: 31 MMOL/L (ref 21–32)
CREAT SERPL-MCNC: 1.2 MG/DL (ref 0.8–1.5)
GLUCOSE SERPL-MCNC: 109 MG/DL (ref 65–100)
POTASSIUM SERPL-SCNC: 4.3 MMOL/L (ref 3.5–5.1)
SODIUM SERPL-SCNC: 136 MMOL/L (ref 136–146)

## 2023-12-13 PROCEDURE — 92507 TX SP LANG VOICE COMM INDIV: CPT

## 2023-12-13 NOTE — TELEPHONE ENCOUNTER
Spoke with the patient's spouse Kee Hashimoto) and notified her that the order for the CT scan has been established and they should be able to call radiology to get this scheduled. Jovita verbalized understanding and did not have any further questions or concerns at this time.  // Starling Standard

## 2023-12-13 NOTE — PROGRESS NOTES
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 Medications Last Reviewed:  2023  Subjective Comments:  Patient was seen by ENT and is referred to Dr. Raffi Valenzuela for consult of vocal cord augmentation. Updated laryngoscopy with erythema and edema of R FVC is greatly reduced. Left vocal fold paralysis remains present. Has CT neck Friday. Pain:  Patient does not c/o pain. Interventions Planned: (Treatment may consist of any combination of the following)  Voice based treatment, Vocal hygiene practices, Training in compensatory strategies, and Education  GOALS: (Goals have been discussed and agreed upon with patient.)  Short-Term Functional Goals: Time Frame: 8 weeks  Patient will demonstrate implementation of vocal hygiene techniques including improved hydration, reduced habitual coughing, throat clearing, and adherence to reflux precautions and medications regiment, reduction of vocally abusive behaviors with 80% adherence on a daily basis to reduce laryngeal inflammation. Patient will perform vocal function exercises with 80% efficiency with mod cues. Patient will implement vocal hygiene practices and relaxation techniques to reduce vocal strain with 80% accuracy with mod cues.   Patient will demonstrate resonant voice

## 2023-12-20 ENCOUNTER — HOSPITAL ENCOUNTER (OUTPATIENT)
Dept: PHYSICAL THERAPY | Age: 73
Setting detail: RECURRING SERIES
End: 2023-12-20
Payer: MEDICARE

## 2023-12-20 NOTE — PROGRESS NOTES
Vazquez HAIR Kojo  : 1950  Primary: [employer]  Secondary:  [facility] [place] [address] [place] [place] [address]  Phone: [phone]  Fax: [phone]    SLP Visit Info:  Canceled Appointment: 2 ( death in the family)  Total # of Visits to Date: 5      OUTPATIENT THERAPY: 2023  Episode  Appt Desk        Vazquez Varma cancelled his appointment for today due to  death in the family .  Will plan to follow up next during next appointment.  Thank you,  LUIS MIGUEL Morelos    Future Appointments   Date Time Provider Department Center   2023  7:00 PM Angela Berg SLP S[facility]   2023  8:45 AM SFO CT 64 SLICE UNIT 1 SFORCT SFO   1/3/2024 10:15 AM Angela Berg SLP S[facility]ORPT SFD   1/3/2024  3:45 PM Andrew Rodriguez MD [facility] AMB   2024  9:50 AM Cele Snell, APRN - CNP [facility]PS [facility] AMB

## 2023-12-28 ENCOUNTER — HOSPITAL ENCOUNTER (OUTPATIENT)
Dept: CT IMAGING | Age: 73
Discharge: HOME | End: 2023-12-31
Attending: INTERNAL MEDICINE

## 2023-12-28 DIAGNOSIS — J84.9 ILD (INTERSTITIAL LUNG DISEASE) (HCC): ICD-10-CM

## 2024-01-03 ENCOUNTER — HOSPITAL ENCOUNTER (OUTPATIENT)
Age: 74
Setting detail: RECURRING SERIES
Discharge: HOME | End: 2024-01-06
Payer: MEDICARE

## 2024-01-03 ENCOUNTER — OFFICE VISIT (OUTPATIENT)
Age: 74
End: 2024-01-03
Payer: MEDICARE

## 2024-01-03 VITALS
BODY MASS INDEX: 27.44 KG/M2 | WEIGHT: 196 LBS | HEART RATE: 62 BPM | HEIGHT: 71 IN | DIASTOLIC BLOOD PRESSURE: 70 MMHG | SYSTOLIC BLOOD PRESSURE: 130 MMHG

## 2024-01-03 DIAGNOSIS — I48.11 HYPERCOAGULABLE STATE DUE TO LONGSTANDING PERSISTENT ATRIAL FIBRILLATION (HCC): ICD-10-CM

## 2024-01-03 DIAGNOSIS — D68.69 HYPERCOAGULABLE STATE DUE TO LONGSTANDING PERSISTENT ATRIAL FIBRILLATION (HCC): ICD-10-CM

## 2024-01-03 DIAGNOSIS — I50.23 ACUTE ON CHRONIC SYSTOLIC HEART FAILURE (HCC): Primary | ICD-10-CM

## 2024-01-03 DIAGNOSIS — I48.11 LONGSTANDING PERSISTENT ATRIAL FIBRILLATION (HCC): ICD-10-CM

## 2024-01-03 PROCEDURE — 1123F ACP DISCUSS/DSCN MKR DOCD: CPT | Performed by: INTERNAL MEDICINE

## 2024-01-03 PROCEDURE — 99214 OFFICE O/P EST MOD 30 MIN: CPT | Performed by: INTERNAL MEDICINE

## 2024-01-03 PROCEDURE — 92507 TX SP LANG VOICE COMM INDIV: CPT

## 2024-01-03 PROCEDURE — 3017F COLORECTAL CA SCREEN DOC REV: CPT | Performed by: INTERNAL MEDICINE

## 2024-01-03 PROCEDURE — G8419 CALC BMI OUT NRM PARAM NOF/U: HCPCS | Performed by: INTERNAL MEDICINE

## 2024-01-03 PROCEDURE — G8427 DOCREV CUR MEDS BY ELIG CLIN: HCPCS | Performed by: INTERNAL MEDICINE

## 2024-01-03 PROCEDURE — G8484 FLU IMMUNIZE NO ADMIN: HCPCS | Performed by: INTERNAL MEDICINE

## 2024-01-03 PROCEDURE — 1036F TOBACCO NON-USER: CPT | Performed by: INTERNAL MEDICINE

## 2024-01-03 RX ORDER — LOSARTAN POTASSIUM 25 MG/1
25 TABLET ORAL DAILY
Qty: 90 TABLET | Refills: 3 | Status: SHIPPED | OUTPATIENT
Start: 2024-01-03

## 2024-01-03 RX ORDER — METOPROLOL SUCCINATE 25 MG/1
25 TABLET, EXTENDED RELEASE ORAL DAILY
Qty: 90 TABLET | Refills: 3 | Status: SHIPPED | OUTPATIENT
Start: 2024-01-03

## 2024-01-03 RX ORDER — FUROSEMIDE 40 MG/1
40 TABLET ORAL DAILY
Qty: 90 TABLET | Refills: 3 | Status: SHIPPED | OUTPATIENT
Start: 2024-01-03

## 2024-01-03 ASSESSMENT — ENCOUNTER SYMPTOMS
BACK PAIN: 0
RESPIRATORY NEGATIVE: 1
CHEST TIGHTNESS: 0
ABDOMINAL PAIN: 0
EYES NEGATIVE: 1
EYE PAIN: 0
SHORTNESS OF BREATH: 0
ALLERGIC/IMMUNOLOGIC NEGATIVE: 1
GASTROINTESTINAL NEGATIVE: 1
PHOTOPHOBIA: 0

## 2024-01-03 NOTE — PROGRESS NOTES
vowel for 10 seconds with SBA.  Patient will apply easy onset of phonation across tasks with >80% accuracy given mod cues.  Patient will demonstrate breath control for a minimum of 10 seconds when vocalizing.  Discharge Goals: Time Frame: 12 weeks  Patient will demonstrate improved vocal quality/loudness/intonation to communicate effectively across environments.     Updated Objective Findings:  None Today  Treatment   Vocal hygiene: Patient reports using saline rinse, states it is helping expel congestion, decreased caffeine intake, 64 oz water/day, replaces throat clear with \"silent cough\". Pt reports decreased congestion and decreased need for silent cough.     Voice Activities:  MPT: 7.48 seconds  Vocal quality breathy, whispered  Diaphragmatic breathing: completed x8, adequate inspiratory/expiratory effort, thoracic breathing; shoulder movement in upright position, decreased when cued pt to bring arms behind back to bring shoulders back (instead of up)  Yawn sigh x8, achieved slight voicing with strained vocal quality, slight pitch change (higher) before guttural voicing on exhalation, maintains relaxed position  Tongue trill: sustained pitch with slight voicing x7, very limited ability to vary pitch; remains pharyngeal focused, s  Straw phonation: 8x with sustained voicing, breathy with slight voicing,  laryngeal effort present, sustained for 5-7 seconds, brief periods of forward resonant before returning to laryngeal effort   Designed to strengthen the vocal fold and improve breath support for speaking. Patient completed 1 set of 5 in the session with min cues, including sustained /u/ sound - breathy vocal quality; deferred \"whoop\" going up in pitch, \"boom\" going down in pitch trials d/t limited voicing present  Glottal onset with pulsing vowel --  Hum with \"m\" in isolation x8 - slight voicing, guttural sound, pharyngeal focused, forward voicing for brief period x5  M syllable \"me\", \"moo\": 20% efficiency, mod

## 2024-01-03 NOTE — PROGRESS NOTES
[facility] CARDIOLOGY  [address] [place] [address], [address]  [place] [address]  PHONE: [phone]      24    NAME:  Vazquez Varma  : 1950  MRN: [id number]         SUBJECTIVE:   Vazquez Varma is a 73 y.o. male seen for follow up of:      No chief complaint on file.       Cardiac Hx (Reviewed and summarized by me):  Followed by Annie Chavez MD at [facility]  1) Chronic Afib - since   2) HLD              Lipids 23 - HDL 42, LDL 55, Trig 111  3) HTN  4) CKD              23 - Stage IIIb, eGFR 32, Cr 2.13 labs (Creatinine clearance, original Cockcroft-Gault: 32)              23 - Estimated Creatinine Clearance: 77 mL/min (based on SCr of 0.9 mg/dL).  5) Echo              23 - LVEF 65% mod MR, mod TR, LAE, AHMET, aortic root 4.0 cm. RVSP 45 mmHg              23 - LVEF 30-35% with mild TR and MR RVSP 35.2              23 - LVEF 42% with m/mMR, modTR, BiAE   NTproBNP 23 - 711        Retired college .      HPI:  Last visit we discovered an elevated NT proBNP and some crackles on exam.  We started him on higher doses of Lasix and his shortness of breath is improved.  Follow-up BMP showed stable renal disease.    Past Medical History, Past Surgical History, Family history, Social History, and Medications were all reviewed with the patient today and updated as necessary.       Current Outpatient Medications:     losartan (COZAAR) 25 MG tablet, Take 1 tablet by mouth daily, Disp: 30 tablet, Rfl: 11    metoprolol succinate (TOPROL XL) 25 MG extended release tablet, Take 1 tablet by mouth daily, Disp: 30 tablet, Rfl: 11    furosemide (LASIX) 20 MG tablet, Take 1 tablet by mouth daily (Patient taking differently: Take 2 tablets by mouth daily), Disp: 90 tablet, Rfl: 3    traZODone (DESYREL) 50 MG tablet, , Disp: , Rfl:     Menaquinone-7 (VITAMIN K2) 100 MCG CAPS, , Disp: , Rfl:     atorvastatin (LIPITOR) 20 MG tablet, Take 1 tablet by mouth nightly,

## 2024-01-04 ENCOUNTER — TELEPHONE (OUTPATIENT)
Dept: ENT CLINIC | Age: 74
End: 2024-01-04

## 2024-01-04 NOTE — TELEPHONE ENCOUNTER
I spoke with the pt, he has a follow up with his pulmonologist Dr. Ochoa to review CT scan and discussed next steps regarding the infiltrates.     He is continuing speech therapy and I agree with that plan.    No

## 2024-01-10 ENCOUNTER — HOSPITAL ENCOUNTER (OUTPATIENT)
Dept: PHYSICAL THERAPY | Age: 74
Setting detail: RECURRING SERIES
Discharge: HOME | End: 2024-01-13
Payer: MEDICARE

## 2024-01-10 PROCEDURE — 92507 TX SP LANG VOICE COMM INDIV: CPT

## 2024-01-10 NOTE — PROGRESS NOTES
Vazquez T Kojo  : 1950  Primary: [employer]  Secondary:  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  Medications Last Reviewed:  1/10/2024  Subjective Comments:  Patient has appointment with [facility] . Got CT results via [employer] and has contacted Dr. Ochoa to discuss further. Has appointment with that office on . Had a really good day yesterday, having more congestion today.  Pain:  Patient does not c/o pain.    Interventions Planned: (Treatment may consist of any combination of the following)  Voice based treatment, Vocal hygiene practices, Training in compensatory strategies, and Education  GOALS: (Goals have been discussed and agreed upon with patient.)  Short-Term Functional Goals: Time Frame: 8 weeks  Patient will demonstrate implementation of vocal hygiene techniques including improved hydration, reduced habitual coughing, throat clearing, and adherence to reflux precautions and medications regiment, reduction of vocally abusive behaviors with 80% adherence on a daily basis to reduce laryngeal inflammation.  Patient will perform vocal function exercises with 80% efficiency with mod cues.  Patient will implement vocal hygiene practices and relaxation techniques to reduce vocal strain with 80% accuracy with mod cues.  Patient will demonstrate

## 2024-01-16 NOTE — PROGRESS NOTES
Vazquez T Kojo  : 1950  Primary: [employer]  Secondary:  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  Medications Last Reviewed:  2024  Subjective Comments:  Patient will see pulmonology tomorrow and is being recommended prednisone.  Pain:  Patient does not c/o pain.    Interventions Planned: (Treatment may consist of any combination of the following)  Voice based treatment, Vocal hygiene practices, Training in compensatory strategies, and Education  GOALS: (Goals have been discussed and agreed upon with patient.)  Short-Term Functional Goals: Time Frame: 8 weeks  Patient will demonstrate implementation of vocal hygiene techniques including improved hydration, reduced habitual coughing, throat clearing, and adherence to reflux precautions and medications regiment, reduction of vocally abusive behaviors with 80% adherence on a daily basis to reduce laryngeal inflammation.  Patient will perform vocal function exercises with 80% efficiency with mod cues.  Patient will implement vocal hygiene practices and relaxation techniques to reduce vocal strain with 80% accuracy with mod cues.  Patient will demonstrate resonant voice therapy techniques across tasks with 80% accuracy given mod cues.  Patient will sustain vowel for 10 seconds with SBA.  Patient will apply easy onset

## 2024-01-17 ENCOUNTER — HOSPITAL ENCOUNTER (OUTPATIENT)
Dept: PHYSICAL THERAPY | Age: 74
Setting detail: RECURRING SERIES
Discharge: HOME | End: 2024-01-20
Payer: MEDICARE

## 2024-01-17 PROCEDURE — 92507 TX SP LANG VOICE COMM INDIV: CPT

## 2024-01-17 NOTE — PROGRESS NOTES
index is 27.56 kg/m².  Vitals:    01/18/24 1031   BP: 122/74   Pulse: 70   Resp: 17   Temp: 97.9 °F (36.6 °C)   TempSrc: Temporal   SpO2: 91%   Weight: 89.6 kg (197 lb 9.6 oz)   Height: 1.803 m (5' 11\")         General:   Alert, cooperative, no distress, appears stated age.        Eyes:   Conjunctivae/corneas clear. PERRL        Mouth/Throat:  Lips, mucosa, and tongue normal. Teeth and gums normal.  Voice is Voice is hoarse/raspy.        Lungs:   Breath sounds decreased bilaterally with crackles in the bases, right worse than left.     Heart:   Regular rate and rhythm, S1, S2 normal, no murmur, click, rub or gallop.     Abdomen:    Soft, non-tender.     Extremities:  Extremities normal, atraumatic, no cyanosis or edema.     Skin:  Skin color normal. No rashes or lesions     Neurologic:  A&Ox3     DIAGNOSTIC TESTS:                                                                                    LABS:   Lab Results   Component Value Date/Time    NTPROBNP 711 12/04/2023 12:36 PM    CARLOS Negative 08/25/2023 03:02 PM    RF Negative 08/25/2023 03:02 PM    CRP <0.3 08/25/2023 03:02 PM     Imaging: I performed an independent interpretation of the patient's images.  CXR:   XR HIP 2-3 VW W PELVIS LEFT 10/26/2020    Narrative  Patient Name:   MARCUS MACIAS  YOB: 1950  Procedure: XRAY PELVIS WITH HIP 1 VIEW LT  Date of Service: 10/26/2020      EXAM: XRAY PELVIS WITH HIP 1 VIEW LT    INDICATION:  ICD-10 W19.XXXA Fall, initial encounter    IMPRESSION/FINDINGS:  No comparison. No acute fracture of the pelvis or left hip. Mild degenerative arthritis both hips. Mild degenerative changes lower lumbar spine. Vascular calcifications.    Finalized by: Efrem Deshpande on 10/26/2020 3:08 PM CDT    Patient/Procedure Information:  [address] [place]  MRN/RONALD: [id number]  Order Number: [id number]  Accession Number: [id number]  Ordering Provider: SHER DE LOS SANTOS  Authorizing Provider: SHER

## 2024-01-18 ENCOUNTER — OFFICE VISIT (OUTPATIENT)
Dept: PULMONOLOGY | Age: 74
End: 2024-01-18
Payer: MEDICARE

## 2024-01-18 VITALS
RESPIRATION RATE: 17 BRPM | SYSTOLIC BLOOD PRESSURE: 122 MMHG | DIASTOLIC BLOOD PRESSURE: 74 MMHG | TEMPERATURE: 97.9 F | HEART RATE: 70 BPM | BODY MASS INDEX: 27.66 KG/M2 | HEIGHT: 71 IN | OXYGEN SATURATION: 91 % | WEIGHT: 197.6 LBS

## 2024-01-18 DIAGNOSIS — R59.9 ADENOPATHY: ICD-10-CM

## 2024-01-18 DIAGNOSIS — J84.9 ILD (INTERSTITIAL LUNG DISEASE) (HCC): Primary | ICD-10-CM

## 2024-01-18 DIAGNOSIS — J12.82 PNEUMONIA DUE TO COVID-19 VIRUS: ICD-10-CM

## 2024-01-18 DIAGNOSIS — J38.00 VOCAL CORD PARALYSIS: ICD-10-CM

## 2024-01-18 DIAGNOSIS — U07.1 PNEUMONIA DUE TO COVID-19 VIRUS: ICD-10-CM

## 2024-01-18 PROCEDURE — G8419 CALC BMI OUT NRM PARAM NOF/U: HCPCS | Performed by: NURSE PRACTITIONER

## 2024-01-18 PROCEDURE — G8427 DOCREV CUR MEDS BY ELIG CLIN: HCPCS | Performed by: NURSE PRACTITIONER

## 2024-01-18 PROCEDURE — 1036F TOBACCO NON-USER: CPT | Performed by: NURSE PRACTITIONER

## 2024-01-18 PROCEDURE — 1123F ACP DISCUSS/DSCN MKR DOCD: CPT | Performed by: NURSE PRACTITIONER

## 2024-01-18 PROCEDURE — 99215 OFFICE O/P EST HI 40 MIN: CPT | Performed by: NURSE PRACTITIONER

## 2024-01-18 PROCEDURE — G8484 FLU IMMUNIZE NO ADMIN: HCPCS | Performed by: NURSE PRACTITIONER

## 2024-01-18 PROCEDURE — 3017F COLORECTAL CA SCREEN DOC REV: CPT | Performed by: NURSE PRACTITIONER

## 2024-01-18 RX ORDER — FLUTICASONE PROPIONATE AND SALMETEROL 113; 14 UG/1; UG/1
1 POWDER, METERED RESPIRATORY (INHALATION) 2 TIMES DAILY
Qty: 1 EACH | Refills: 11 | Status: SHIPPED | OUTPATIENT
Start: 2024-01-18

## 2024-01-18 RX ORDER — PREDNISONE 20 MG/1
TABLET ORAL
Qty: 30 TABLET | Refills: 0 | Status: SHIPPED | OUTPATIENT
Start: 2024-01-18

## 2024-01-18 NOTE — PATIENT INSTRUCTIONS
AirDuo 1 puff twice daily.  Rinse mouth after use    Flutter valve 10 exhalation 3-4 times daily.    Prednisone 20 mg tabs--2 tabs po qd x 4 days, 1 and 1/2 tabs po qd x 4 days, 1 tab po qd x 4 days, 1/2 tab po qd x 4 days.     I have ordered CT of chest in 2 months.  You should receive a call from scheduling within 2-3 business days.  If you do not hear from them, please call [phone] to schedule your test.

## 2024-01-24 ENCOUNTER — TELEPHONE (OUTPATIENT)
Dept: SLEEP MEDICINE | Age: 74
End: 2024-01-24

## 2024-01-24 NOTE — PROGRESS NOTES
defined types were placed in this encounter.        Collaborating Physician: Dr. Guillaume    Over 50% of today's office visit was spent in face to face time reviewing test results, prognosis, importance of compliance, education about disease process, benefits of medications, instructions for management of acute flare-ups, and follow up plans.  Total face to face time spent with patient was 30 minutes.        BRUCE Mari  Electronically signed

## 2024-01-24 NOTE — TELEPHONE ENCOUNTER
I called patient to remind patient to bring PAP machine to office visit. Spoke to his spouse.  She agreed.

## 2024-01-25 ENCOUNTER — OFFICE VISIT (OUTPATIENT)
Dept: SLEEP MEDICINE | Age: 74
End: 2024-01-25
Payer: MEDICARE

## 2024-01-25 VITALS
HEART RATE: 68 BPM | HEIGHT: 71 IN | TEMPERATURE: 97.2 F | WEIGHT: 197 LBS | DIASTOLIC BLOOD PRESSURE: 88 MMHG | SYSTOLIC BLOOD PRESSURE: 132 MMHG | RESPIRATION RATE: 18 BRPM | OXYGEN SATURATION: 96 % | BODY MASS INDEX: 27.58 KG/M2

## 2024-01-25 DIAGNOSIS — G47.34 NOCTURNAL HYPOXEMIA: ICD-10-CM

## 2024-01-25 DIAGNOSIS — G47.31 COMPLEX SLEEP APNEA SYNDROME: Primary | ICD-10-CM

## 2024-01-25 DIAGNOSIS — G47.31 CENTRAL SLEEP APNEA: ICD-10-CM

## 2024-01-25 PROCEDURE — G8427 DOCREV CUR MEDS BY ELIG CLIN: HCPCS | Performed by: PHYSICIAN ASSISTANT

## 2024-01-25 PROCEDURE — 1036F TOBACCO NON-USER: CPT | Performed by: PHYSICIAN ASSISTANT

## 2024-01-25 PROCEDURE — G8419 CALC BMI OUT NRM PARAM NOF/U: HCPCS | Performed by: PHYSICIAN ASSISTANT

## 2024-01-25 PROCEDURE — G8484 FLU IMMUNIZE NO ADMIN: HCPCS | Performed by: PHYSICIAN ASSISTANT

## 2024-01-25 PROCEDURE — 3017F COLORECTAL CA SCREEN DOC REV: CPT | Performed by: PHYSICIAN ASSISTANT

## 2024-01-25 PROCEDURE — 99214 OFFICE O/P EST MOD 30 MIN: CPT | Performed by: PHYSICIAN ASSISTANT

## 2024-01-25 PROCEDURE — 1123F ACP DISCUSS/DSCN MKR DOCD: CPT | Performed by: PHYSICIAN ASSISTANT

## 2024-01-25 ASSESSMENT — SLEEP AND FATIGUE QUESTIONNAIRES
HOW LIKELY ARE YOU TO NOD OFF OR FALL ASLEEP IN A CAR, WHILE STOPPED FOR A FEW MINUTES IN TRAFFIC: 0
ESS TOTAL SCORE: 3
HOW LIKELY ARE YOU TO NOD OFF OR FALL ASLEEP WHILE SITTING AND READING: 0
HOW LIKELY ARE YOU TO NOD OFF OR FALL ASLEEP WHILE WATCHING TV: 0
HOW LIKELY ARE YOU TO NOD OFF OR FALL ASLEEP WHILE SITTING QUIETLY AFTER LUNCH WITHOUT ALCOHOL: 0
HOW LIKELY ARE YOU TO NOD OFF OR FALL ASLEEP WHILE SITTING INACTIVE IN A PUBLIC PLACE: 0
HOW LIKELY ARE YOU TO NOD OFF OR FALL ASLEEP WHILE SITTING AND TALKING TO SOMEONE: 0
HOW LIKELY ARE YOU TO NOD OFF OR FALL ASLEEP WHILE LYING DOWN TO REST IN THE AFTERNOON WHEN CIRCUMSTANCES PERMIT: 1
HOW LIKELY ARE YOU TO NOD OFF OR FALL ASLEEP WHEN YOU ARE A PASSENGER IN A CAR FOR AN HOUR WITHOUT A BREAK: 2

## 2024-01-25 NOTE — PATIENT INSTRUCTIONS
The company who will be taking care of your CPAP supplies is:        Address: [address] [place] [address] [address]  [place] [address]  Phone: [phone]  Fax: [phone]

## 2024-01-30 ENCOUNTER — PATIENT MESSAGE (OUTPATIENT)
Dept: PULMONOLOGY | Age: 74
End: 2024-01-30

## 2024-01-30 RX ORDER — FLUTICASONE PROPIONATE AND SALMETEROL 113; 14 UG/1; UG/1
1 POWDER, METERED RESPIRATORY (INHALATION) 2 TIMES DAILY
Qty: 3 EACH | Refills: 3 | Status: SHIPPED | OUTPATIENT
Start: 2024-01-30

## 2024-01-30 NOTE — TELEPHONE ENCOUNTER
From: Vazquez Varma  To: Cele Snell  Sent: 1/30/2024 1:30 PM EST  Subject: AirDuo Inhaler    Rodriguez Oakley,    I have less than two weeks left on the steroid inhaler and wondered if I needed to get a refill for it. I have almost completed the prednisone regimen, so I guess I'll be done with that. Thanks.    Vazquez Varma

## 2024-02-09 ENCOUNTER — NURSE ONLY (OUTPATIENT)
Dept: PULMONOLOGY | Age: 74
End: 2024-02-09

## 2024-02-09 DIAGNOSIS — J84.9 ILD (INTERSTITIAL LUNG DISEASE) (HCC): Primary | ICD-10-CM

## 2024-02-09 LAB
FEV 1 , POC: 2.25 L
FEV1 % PRED, POC: 65 %
FEV1/FVC, POC: 79
FVC % PRED, POC: 61 %
FVC, POC: 2.84

## 2024-02-09 ASSESSMENT — PULMONARY FUNCTION TESTS
FVC_PERCENT_PREDICTED_POC: 61
FEV1/FVC_POC: 79
FEV1_PERCENT_PREDICTED_POC: 65
FVC_POC: 2.84

## 2024-02-12 ENCOUNTER — OFFICE VISIT (OUTPATIENT)
Age: 74
End: 2024-02-12
Payer: MEDICARE

## 2024-02-12 VITALS
BODY MASS INDEX: 28.14 KG/M2 | WEIGHT: 201 LBS | HEIGHT: 71 IN | HEART RATE: 68 BPM | SYSTOLIC BLOOD PRESSURE: 122 MMHG | DIASTOLIC BLOOD PRESSURE: 60 MMHG

## 2024-02-12 DIAGNOSIS — I50.23 ACUTE ON CHRONIC SYSTOLIC HEART FAILURE (HCC): Primary | ICD-10-CM

## 2024-02-12 DIAGNOSIS — I48.11 LONGSTANDING PERSISTENT ATRIAL FIBRILLATION (HCC): ICD-10-CM

## 2024-02-12 PROCEDURE — G8427 DOCREV CUR MEDS BY ELIG CLIN: HCPCS | Performed by: INTERNAL MEDICINE

## 2024-02-12 PROCEDURE — 99214 OFFICE O/P EST MOD 30 MIN: CPT | Performed by: INTERNAL MEDICINE

## 2024-02-12 PROCEDURE — G8484 FLU IMMUNIZE NO ADMIN: HCPCS | Performed by: INTERNAL MEDICINE

## 2024-02-12 PROCEDURE — 3017F COLORECTAL CA SCREEN DOC REV: CPT | Performed by: INTERNAL MEDICINE

## 2024-02-12 PROCEDURE — G8419 CALC BMI OUT NRM PARAM NOF/U: HCPCS | Performed by: INTERNAL MEDICINE

## 2024-02-12 PROCEDURE — 1036F TOBACCO NON-USER: CPT | Performed by: INTERNAL MEDICINE

## 2024-02-12 PROCEDURE — 1123F ACP DISCUSS/DSCN MKR DOCD: CPT | Performed by: INTERNAL MEDICINE

## 2024-02-12 NOTE — PROGRESS NOTES
[facility] CARDIOLOGY  [address] [place] [address], [address]  [place] [address]  PHONE: [phone]      24    NAME:  Vazquez Varma  : 1950  MRN: [id number]         SUBJECTIVE:   Vazquez Varma is a 73 y.o. male seen for follow up of:      No chief complaint on file.       Cardiac Hx (Reviewed and summarized by me):  Followed by Annie Chavez MD at [facility]  1) Chronic Afib - since   2) HLD              Lipids 23 - HDL 42, LDL 55, Trig 111  3) HTN  4) CKD              23 - Stage IIIb, eGFR 32, Cr 2.13 labs (Creatinine clearance, original Cockcroft-Gault: 32)              23 - Estimated Creatinine Clearance: 77 mL/min (based on SCr of 0.9 mg/dL).  5) Echo              23 - LVEF 65% mod MR, mod TR, LAE, AHMET, aortic root 4.0 cm. RVSP 45 mmHg              23 - LVEF 30-35% with mild TR and MR RVSP 35.2              23 - LVEF 42% with m/mMR, modTR, BiAE              NTproBNP 23 - 711    Retired college .     HPI:  Doing well, remains euvolemic on exam.  Denies HF symptoms moving to [place] next week.    Past Medical History, Past Surgical History, Family history, Social History, and Medications were all reviewed with the patient today and updated as necessary.       Current Outpatient Medications:     Fluticasone-Salmeterol (AIRDUO RESPICLICK 113/14) 113-14 MCG/ACT AEPB, Inhale 1 puff into the lungs in the morning and at bedtime, Disp: 3 each, Rfl: 3    ZINC PO, Take by mouth, Disp: , Rfl:     losartan (COZAAR) 25 MG tablet, Take 1 tablet by mouth daily, Disp: 90 tablet, Rfl: 3    furosemide (LASIX) 40 MG tablet, Take 1 tablet by mouth daily, Disp: 90 tablet, Rfl: 3    metoprolol succinate (TOPROL XL) 25 MG extended release tablet, Take 1 tablet by mouth daily, Disp: 90 tablet, Rfl: 3    traZODone (DESYREL) 50 MG tablet, Take 0.5 tablets by mouth nightly, Disp: , Rfl:     Menaquinone-7 (VITAMIN K2) 100 MCG CAPS, , Disp: , Rfl:     atorvastatin

## 2024-08-15 NOTE — PROGRESS NOTES
ciclopirox (PENLAC) 8 % solution, , Disp: , Rfl:     escitalopram (LEXAPRO) 20 MG tablet, Take 1 tablet by mouth daily, Disp: , Rfl:     metFORMIN (GLUCOPHAGE-XR) 500 MG extended release tablet, Take 4 tablets by mouth daily, Disp: , Rfl:     rivaroxaban (XARELTO) 20 MG TABS tablet, Take 1 tablet by mouth Daily with supper, Disp: , Rfl:     magnesium 200 MG TABS tablet, Take by mouth, Disp: , Rfl:   No Known Allergies  No past medical history on file. No past surgical history on file. No family history on file. Social History     Tobacco Use    Smoking status: Former     Packs/day: 1.00     Years: 15.00     Additional pack years: 0.00     Total pack years: 15.00     Types: Cigarettes     Quit date: 18     Years since quittin.7     Passive exposure: Never    Smokeless tobacco: Never   Substance Use Topics    Alcohol use: Not on file       ROS:  Review of Systems   Constitutional: Negative. Negative for fever. HENT:  Negative for hearing loss, nosebleeds and tinnitus. Eyes: Negative. Negative for photophobia and pain. Respiratory: Negative. Negative for chest tightness and shortness of breath. Cardiovascular: Negative. Negative for chest pain, palpitations and leg swelling. Gastrointestinal: Negative. Negative for abdominal pain. Endocrine: Negative. Negative for cold intolerance and heat intolerance. Genitourinary: Negative. Negative for dysuria. Musculoskeletal: Negative. Negative for back pain and joint swelling. Skin: Negative. Negative for rash. Allergic/Immunologic: Negative. Negative for immunocompromised state. Neurological: Negative. Negative for dizziness, syncope and light-headedness. Hematological: Negative. Does not bruise/bleed easily. Psychiatric/Behavioral: Negative. Negative for suicidal ideas. PHYSICAL EXAM:  Physical Exam  Constitutional:       General: He is not in acute distress. Appearance: He is not ill-appearing.    HENT: Billing Type: Third-Party Bill Expected Date Of Service: 08/15/2024 Bill For Surgical Tray: no